# Patient Record
Sex: MALE | Race: WHITE | Employment: FULL TIME | URBAN - METROPOLITAN AREA
[De-identification: names, ages, dates, MRNs, and addresses within clinical notes are randomized per-mention and may not be internally consistent; named-entity substitution may affect disease eponyms.]

---

## 2025-05-30 ENCOUNTER — OFFICE VISIT (OUTPATIENT)
Dept: BARIATRICS/WEIGHT MGMT | Age: 55
End: 2025-05-30

## 2025-05-30 VITALS
SYSTOLIC BLOOD PRESSURE: 147 MMHG | HEART RATE: 80 BPM | RESPIRATION RATE: 18 BRPM | BODY MASS INDEX: 38.22 KG/M2 | OXYGEN SATURATION: 99 % | DIASTOLIC BLOOD PRESSURE: 94 MMHG | WEIGHT: 273 LBS | HEIGHT: 71 IN

## 2025-05-30 DIAGNOSIS — E66.812 OBESITY, CLASS II, BMI 35-39.9: Primary | ICD-10-CM

## 2025-05-30 RX ORDER — LOSARTAN POTASSIUM 100 MG/1
100 TABLET ORAL DAILY
COMMUNITY
Start: 2025-04-17

## 2025-05-30 NOTE — PROGRESS NOTES
Emanuel Lange is a 55 y.o. male with a date of birth of 1970.    Vitals:    05/30/25 0805   BP: (!) 147/94   Pulse: 80   Weight: 123.8 kg (273 lb)   Height: 1.803 m (5' 11\")    BMI: Body mass index is 38.08 kg/m². Obesity Classification: Class II    Weight History:   Wt Readings from Last 3 Encounters:   05/30/25 123.8 kg (273 lb)   09/20/12 97.5 kg (215 lb)       Pt attended Medical Weight Management Seminar. Patient was educated on low-carb diet protocol. Nutrition and habit guidelines were discussed and written information was provided. Bariatric Nutrition Questionnaire completed during class and scanned into media.       Goals  Weight: 210 lbs  Health Improvement: decrease blood pressure and increase energy    Assessment  Nutritional Needs: RMR=(9.99 x 123.8) + (6.25 x 180.3) - (4.92 x 55 y.o.) +5  = 2108 kcal x 1.3 (sedentary activity factor)= 2740 kcal - 1000 (for 2 lb weight loss/week)= 1740 kcal.    Patient has participated in the following weight loss programs: Calorie Restriction, Keto, and Low Carb Diet.   Patient has participated in meal replacement/liquid diets: Slimfast  Patient has participated in weight loss medications: Adipex  Patient does not have history of bariatric surgery.     Pt does not have food allergies.   Pt states he is a fast eater and often feels overly full/stuffed after eating. Pt reports hx of eating out of boredom, night eating, and grazing on food.     Plan  Plan/Recommendations: Start 1800 kcal LCMP  Optifast:  Not interested  Diet Medications:  Pt interested  Bariatric Surgery:  Not interested  1:1 RD Visit:  Not interested    PES Statement: Overweight/Obesity related to lack of exercise, sedentary lifestyle, unhealthy eating habits, and unsuccessful diet attempts as evidenced by BMI. Body mass index is 38.08 kg/m².    Handouts: New Pt Pkt, Protein Bars/Shakes    Will follow up as necessary.    Luana Wang RD

## 2025-06-02 ENCOUNTER — TELEMEDICINE (OUTPATIENT)
Dept: BARIATRICS/WEIGHT MGMT | Age: 55
End: 2025-06-02
Payer: COMMERCIAL

## 2025-06-02 DIAGNOSIS — Z71.3 DIETARY COUNSELING AND SURVEILLANCE: ICD-10-CM

## 2025-06-02 DIAGNOSIS — E66.812 CLASS 2 OBESITY: Primary | ICD-10-CM

## 2025-06-02 DIAGNOSIS — I10 HTN (HYPERTENSION), BENIGN: ICD-10-CM

## 2025-06-02 PROCEDURE — 3017F COLORECTAL CA SCREEN DOC REV: CPT | Performed by: FAMILY MEDICINE

## 2025-06-02 PROCEDURE — G8427 DOCREV CUR MEDS BY ELIG CLIN: HCPCS | Performed by: FAMILY MEDICINE

## 2025-06-02 PROCEDURE — 99204 OFFICE O/P NEW MOD 45 MIN: CPT | Performed by: FAMILY MEDICINE

## 2025-06-02 ASSESSMENT — ENCOUNTER SYMPTOMS
EYE PAIN: 0
COUGH: 0
CHOKING: 0
WHEEZING: 0
APNEA: 0
BLOOD IN STOOL: 0
VOMITING: 0
NAUSEA: 0
ABDOMINAL DISTENTION: 0
SHORTNESS OF BREATH: 0
PHOTOPHOBIA: 0
DIARRHEA: 0
CONSTIPATION: 0
ABDOMINAL PAIN: 0
CHEST TIGHTNESS: 0

## 2025-06-02 NOTE — PROGRESS NOTES
Patient: Emanuel Lange     Encounter Date: 6/2/2025    YOB: 1970               Age: 55 y.o.        Patient identification was verified at the start of the visit.         6/2/2025     6:59 AM   Patient-Reported Vitals   Patient-Reported Weight 273   Patient-Reported Height 5’11”   Patient-Reported Systolic 161 mmHg   Patient-Reported Diastolic 85 mmHg   Patient-Reported Pulse 72   Patient-Reported Temperature 97.1   Patient-Reported SpO2 97         BP Readings from Last 1 Encounters:   05/30/25 (!) 147/94- F/u with PCP        BMI Readings from Last 1 Encounters:   05/30/25 38.08 kg/m²       Pulse Readings from Last 1 Encounters:   05/30/25 80                                             Wt Readings from Last 3 Encounters:   05/30/25 123.8 kg (273 lb)   09/20/12 97.5 kg (215 lb)        Chief Complaint   Patient presents with    Bariatric, Initial Visit     MWZAN- NP        HPI:    55 y.o. male presents to establish care via video visit. The patient's medical history is significant for class II obesity. The patient has a long-standing history of obesity which started gradually. The problem is moderate.  The patient has been gaining weight.  Risk factors include annual weight gain of >2 lbs (1 kg)/ year and sedentary lifestyle. Aggravating factors include poor diet and lack of physical activity. The patient has tried various diet/exercise plans which have been ineffective in the long-run. he is motivated to start losing weight to help improve his overall health.     Interested in aom to help control appetite.     When did you become overweight?  [] Childhood   [] Teens   [x] Adulthood   [] Pregnancy   [] Menopause    Highest adult weight: 295 pounds at age 53    Triggers for weight gain?   [] Stress   [] Illness   [] Medications   [] Travel  []Injury     [] Nightshift work   [] Insomnia  [x] No specific triggers   [] Other    Food triggers:   [x] Stress   [x] Boredom   [] Fast food   [] Eating out   []

## 2025-06-10 DIAGNOSIS — E66.812 CLASS 2 OBESITY: ICD-10-CM

## 2025-06-10 LAB
25(OH)D3 SERPL-MCNC: 102 NG/ML
ALBUMIN SERPL-MCNC: 4.6 G/DL (ref 3.4–5)
ALBUMIN/GLOB SERPL: 1.8 {RATIO} (ref 1.1–2.2)
ALP SERPL-CCNC: 63 U/L (ref 40–129)
ALT SERPL-CCNC: 38 U/L (ref 10–40)
ANION GAP SERPL CALCULATED.3IONS-SCNC: 12 MMOL/L (ref 3–16)
AST SERPL-CCNC: 29 U/L (ref 15–37)
BILIRUB SERPL-MCNC: 0.8 MG/DL (ref 0–1)
BUN SERPL-MCNC: 16 MG/DL (ref 7–20)
CALCIUM SERPL-MCNC: 9.6 MG/DL (ref 8.3–10.6)
CHLORIDE SERPL-SCNC: 101 MMOL/L (ref 99–110)
CHOLEST SERPL-MCNC: 207 MG/DL (ref 0–199)
CO2 SERPL-SCNC: 23 MMOL/L (ref 21–32)
CREAT SERPL-MCNC: 0.9 MG/DL (ref 0.9–1.3)
DEPRECATED RDW RBC AUTO: 13.6 % (ref 12.4–15.4)
FOLATE SERPL-MCNC: >40 NG/ML (ref 4.78–24.2)
GFR SERPLBLD CREATININE-BSD FMLA CKD-EPI: >90 ML/MIN/{1.73_M2}
GLUCOSE SERPL-MCNC: 106 MG/DL (ref 70–99)
HCT VFR BLD AUTO: 45.7 % (ref 40.5–52.5)
HDLC SERPL-MCNC: 54 MG/DL (ref 40–60)
HGB BLD-MCNC: 15.5 G/DL (ref 13.5–17.5)
LDLC SERPL CALC-MCNC: 133 MG/DL
MCH RBC QN AUTO: 31.2 PG (ref 26–34)
MCHC RBC AUTO-ENTMCNC: 33.9 G/DL (ref 31–36)
MCV RBC AUTO: 92.3 FL (ref 80–100)
PLATELET # BLD AUTO: 240 K/UL (ref 135–450)
PMV BLD AUTO: 10.1 FL (ref 5–10.5)
POTASSIUM SERPL-SCNC: 4.3 MMOL/L (ref 3.5–5.1)
PROT SERPL-MCNC: 7.1 G/DL (ref 6.4–8.2)
RBC # BLD AUTO: 4.95 M/UL (ref 4.2–5.9)
SODIUM SERPL-SCNC: 136 MMOL/L (ref 136–145)
TRIGL SERPL-MCNC: 100 MG/DL (ref 0–150)
TSH SERPL DL<=0.005 MIU/L-ACNC: 1.01 UIU/ML (ref 0.27–4.2)
VIT B12 SERPL-MCNC: 556 PG/ML (ref 211–911)
VLDLC SERPL CALC-MCNC: 20 MG/DL
WBC # BLD AUTO: 6.2 K/UL (ref 4–11)

## 2025-06-11 LAB
EST. AVERAGE GLUCOSE BLD GHB EST-MCNC: 102.5 MG/DL
HBA1C MFR BLD: 5.2 %

## 2025-06-20 ENCOUNTER — TELEMEDICINE (OUTPATIENT)
Dept: BARIATRICS/WEIGHT MGMT | Age: 55
End: 2025-06-20

## 2025-06-20 ENCOUNTER — TELEPHONE (OUTPATIENT)
Dept: BARIATRICS/WEIGHT MGMT | Age: 55
End: 2025-06-20

## 2025-06-20 DIAGNOSIS — E78.5 HYPERLIPIDEMIA, UNSPECIFIED HYPERLIPIDEMIA TYPE: ICD-10-CM

## 2025-06-20 DIAGNOSIS — Z71.3 DIETARY COUNSELING AND SURVEILLANCE: ICD-10-CM

## 2025-06-20 DIAGNOSIS — E66.812 CLASS 2 OBESITY: Primary | ICD-10-CM

## 2025-06-20 ASSESSMENT — ENCOUNTER SYMPTOMS
PHOTOPHOBIA: 0
CONSTIPATION: 0
BLOOD IN STOOL: 0
NAUSEA: 0
VOMITING: 0
ABDOMINAL DISTENTION: 0
COUGH: 0
APNEA: 0
CHOKING: 0
EYE PAIN: 0
ABDOMINAL PAIN: 0
SHORTNESS OF BREATH: 0
CHEST TIGHTNESS: 0
DIARRHEA: 0
WHEEZING: 0

## 2025-06-20 NOTE — PROGRESS NOTES
Patient: Emanuel Lange                      Encounter Date: 6/20/2025    YOB: 1970                Age: 55 y.o.    Chief Complaint   Patient presents with    Weight Management     F/u MWM          Patient identification was verified at the start of the visit.         6/17/2025     8:52 AM   Patient-Reported Vitals   Patient-Reported Weight 273   Patient-Reported Height 5’11”   Patient-Reported Systolic 145 mmHg   Patient-Reported Diastolic 85 mmHg   Patient-Reported Pulse 80   Patient-Reported Temperature 97         BP Readings from Last 1 Encounters:   05/30/25 (!) 147/94       BMI Readings from Last 1 Encounters:   05/30/25 38.08 kg/m²       Pulse Readings from Last 1 Encounters:   05/30/25 80            HPI: 55 y.o. male with a long-standing history of obesity presents today for virtual video follow-up.  His weight is stable from his last visit.  No significant changes in diet or physical activity.  He is interested in starting on antiobesity medications to help with weight loss.    Labs reviewed with patient    No Known Allergies      Current Outpatient Medications:     tirzepatide-weight management (ZEPBOUND) 2.5 MG/0.5ML SOAJ subCUTAneous auto-injector pen, Inject 2.5 mg into the skin every 7 days, Disp: 2 mL, Rfl: 0    losartan (COZAAR) 100 MG tablet, Take 1 tablet by mouth daily, Disp: , Rfl:     ibuprofen (ADVIL;MOTRIN) 200 MG tablet, Take 600 mg by mouth every 8 hours as needed.  , Disp: , Rfl:     There is no problem list on file for this patient.      Review of Systems   Constitutional:  Negative for fatigue.   Eyes:  Negative for photophobia, pain and visual disturbance.   Respiratory:  Negative for apnea, cough, choking, chest tightness, shortness of breath and wheezing.    Cardiovascular:  Negative for chest pain, palpitations and leg swelling.   Gastrointestinal:  Negative for abdominal distention, abdominal pain, blood in stool, constipation, diarrhea, nausea and vomiting.   Endocrine:

## 2025-07-18 ENCOUNTER — TELEMEDICINE (OUTPATIENT)
Dept: BARIATRICS/WEIGHT MGMT | Age: 55
End: 2025-07-18

## 2025-07-18 DIAGNOSIS — Z71.3 DIETARY COUNSELING AND SURVEILLANCE: ICD-10-CM

## 2025-07-18 DIAGNOSIS — E66.812 CLASS 2 OBESITY: Primary | ICD-10-CM

## 2025-07-18 ASSESSMENT — ENCOUNTER SYMPTOMS
BLOOD IN STOOL: 0
APNEA: 0
VOMITING: 0
NAUSEA: 0
ABDOMINAL DISTENTION: 0
DIARRHEA: 0
CHOKING: 0
CONSTIPATION: 0
CHEST TIGHTNESS: 0
ABDOMINAL PAIN: 0
COUGH: 0
PHOTOPHOBIA: 0
SHORTNESS OF BREATH: 0
EYE PAIN: 0
WHEEZING: 0

## 2025-07-18 NOTE — PROGRESS NOTES
Patient: Emanuel Lange                      Encounter Date: 7/18/2025    YOB: 1970                Age: 55 y.o.    Chief Complaint   Patient presents with    Weight Management     F/u MWM          Patient identification was verified at the start of the visit.         7/15/2025     8:36 AM   Patient-Reported Vitals   Patient-Reported Weight 256   Patient-Reported Height 5’11”   Patient-Reported Systolic 130 mmHg   Patient-Reported Diastolic 82 mmHg   Patient-Reported Pulse 70   Patient-Reported Temperature 98         BP Readings from Last 1 Encounters:   05/30/25 (!) 147/94       BMI Readings from Last 1 Encounters:   05/30/25 38.08 kg/m²       Pulse Readings from Last 1 Encounters:   05/30/25 80          Wt Readings from Last 3 Encounters:   05/30/25 123.8 kg (273 lb)   09/20/12 97.5 kg (215 lb)        HPI: 55 y.o. male with a long-standing history of obesity presents today for virtual video follow-up. he has 17 lost pounds since his last visit. Current treatment includes Zepbound 2.5 mg SC weekly and low carb/iris diet. Tolerating it well. Making better dietary choices. Motivated to continue losing weight.     Medication(s): Appetite well controlled?     [x]Yes      []No    Focus:     [x]Good     []Fair     []Poor    Side effects? No        Any recent change in medication(s)? No    Exercise: [x]Cardio- 10 minutes, 3x/week     [x]Resistance/strength training     []Other:     No Known Allergies      Current Outpatient Medications:     tirzepatide-weight management (ZEPBOUND) 5 MG/0.5ML SOAJ subCUTAneous auto-injector pen, Inject 5 mg into the skin every 7 days, Disp: 2 mL, Rfl: 0    losartan (COZAAR) 100 MG tablet, Take 1 tablet by mouth daily, Disp: , Rfl:     ibuprofen (ADVIL;MOTRIN) 200 MG tablet, Take 600 mg by mouth every 8 hours as needed.  , Disp: , Rfl:     There is no problem list on file for this patient.      Review of Systems   Constitutional:  Negative for fatigue.   Eyes:  Negative for

## 2025-08-13 ENCOUNTER — TELEMEDICINE (OUTPATIENT)
Dept: BARIATRICS/WEIGHT MGMT | Age: 55
End: 2025-08-13

## 2025-08-13 DIAGNOSIS — E66.812 CLASS 2 OBESITY: Primary | ICD-10-CM

## 2025-08-13 DIAGNOSIS — Z71.3 DIETARY COUNSELING AND SURVEILLANCE: ICD-10-CM

## 2025-08-13 ASSESSMENT — ENCOUNTER SYMPTOMS
EYE PAIN: 0
COUGH: 0
WHEEZING: 0
NAUSEA: 0
PHOTOPHOBIA: 0
APNEA: 0
CHEST TIGHTNESS: 0
CONSTIPATION: 0
VOMITING: 0
SHORTNESS OF BREATH: 0
BLOOD IN STOOL: 0
CHOKING: 0
DIARRHEA: 0
ABDOMINAL DISTENTION: 0
ABDOMINAL PAIN: 0

## 2025-08-19 DIAGNOSIS — E66.812 CLASS 2 OBESITY: Primary | ICD-10-CM

## 2025-08-19 DIAGNOSIS — Z71.3 DIETARY COUNSELING AND SURVEILLANCE: ICD-10-CM

## 2025-08-28 VITALS — BODY MASS INDEX: 35.2 KG/M2 | WEIGHT: 252.4 LBS
